# Patient Record
Sex: MALE | Race: BLACK OR AFRICAN AMERICAN | NOT HISPANIC OR LATINO | URBAN - METROPOLITAN AREA
[De-identification: names, ages, dates, MRNs, and addresses within clinical notes are randomized per-mention and may not be internally consistent; named-entity substitution may affect disease eponyms.]

---

## 2024-05-02 PROCEDURE — 99285 EMERGENCY DEPT VISIT HI MDM: CPT

## 2024-05-02 PROCEDURE — 99285 EMERGENCY DEPT VISIT HI MDM: CPT | Performed by: EMERGENCY MEDICINE

## 2024-05-02 ASSESSMENT — COLUMBIA-SUICIDE SEVERITY RATING SCALE - C-SSRS
1. IN THE PAST MONTH, HAVE YOU WISHED YOU WERE DEAD OR WISHED YOU COULD GO TO SLEEP AND NOT WAKE UP?: NO
6. HAVE YOU EVER DONE ANYTHING, STARTED TO DO ANYTHING, OR PREPARED TO DO ANYTHING TO END YOUR LIFE?: NO
2. HAVE YOU ACTUALLY HAD ANY THOUGHTS OF KILLING YOURSELF?: NO

## 2024-05-03 ENCOUNTER — HOSPITAL ENCOUNTER (OUTPATIENT)
Facility: HOSPITAL | Age: 69
Setting detail: OBSERVATION
Discharge: HOME | End: 2024-05-04
Attending: EMERGENCY MEDICINE | Admitting: STUDENT IN AN ORGANIZED HEALTH CARE EDUCATION/TRAINING PROGRAM
Payer: OTHER GOVERNMENT

## 2024-05-03 DIAGNOSIS — G47.09 OTHER INSOMNIA: ICD-10-CM

## 2024-05-03 DIAGNOSIS — F10.930 ALCOHOL WITHDRAWAL SYNDROME WITHOUT COMPLICATION (MULTI): Primary | ICD-10-CM

## 2024-05-03 LAB
ALBUMIN SERPL BCP-MCNC: 4.8 G/DL (ref 3.4–5)
ALP SERPL-CCNC: 65 U/L (ref 33–136)
ALT SERPL W P-5'-P-CCNC: 20 U/L (ref 10–52)
AMPHETAMINES UR QL SCN: ABNORMAL
ANION GAP SERPL CALC-SCNC: 14 MMOL/L (ref 10–20)
AST SERPL W P-5'-P-CCNC: 29 U/L (ref 9–39)
BARBITURATES UR QL SCN: ABNORMAL
BENZODIAZ UR QL SCN: ABNORMAL
BILIRUB SERPL-MCNC: 0.6 MG/DL (ref 0–1.2)
BUN SERPL-MCNC: 9 MG/DL (ref 6–23)
BZE UR QL SCN: ABNORMAL
CALCIUM SERPL-MCNC: 9.5 MG/DL (ref 8.6–10.6)
CANNABINOIDS UR QL SCN: ABNORMAL
CHLORIDE SERPL-SCNC: 107 MMOL/L (ref 98–107)
CO2 SERPL-SCNC: 27 MMOL/L (ref 21–32)
CREAT SERPL-MCNC: 0.79 MG/DL (ref 0.5–1.3)
EGFRCR SERPLBLD CKD-EPI 2021: >90 ML/MIN/1.73M*2
ERYTHROCYTE [DISTWIDTH] IN BLOOD BY AUTOMATED COUNT: 12.3 % (ref 11.5–14.5)
ETHANOL SERPL-MCNC: 68 MG/DL
FENTANYL+NORFENTANYL UR QL SCN: ABNORMAL
GLUCOSE SERPL-MCNC: 90 MG/DL (ref 74–99)
HCT VFR BLD AUTO: 41.3 % (ref 41–52)
HGB BLD-MCNC: 14.8 G/DL (ref 13.5–17.5)
LEVETIRACETAM SERPL-MCNC: 11 UG/ML (ref 10–40)
MAGNESIUM SERPL-MCNC: 1.75 MG/DL (ref 1.6–2.4)
MCH RBC QN AUTO: 31.6 PG (ref 26–34)
MCHC RBC AUTO-ENTMCNC: 35.8 G/DL (ref 32–36)
MCV RBC AUTO: 88 FL (ref 80–100)
METHADONE UR QL SCN: ABNORMAL
NRBC BLD-RTO: 0 /100 WBCS (ref 0–0)
OPIATES UR QL SCN: ABNORMAL
OXYCODONE+OXYMORPHONE UR QL SCN: ABNORMAL
PCP UR QL SCN: ABNORMAL
PLATELET # BLD AUTO: 221 X10*3/UL (ref 150–450)
POTASSIUM SERPL-SCNC: 3.8 MMOL/L (ref 3.5–5.3)
PROT SERPL-MCNC: 7.3 G/DL (ref 6.4–8.2)
RBC # BLD AUTO: 4.68 X10*6/UL (ref 4.5–5.9)
SODIUM SERPL-SCNC: 144 MMOL/L (ref 136–145)
WBC # BLD AUTO: 5.1 X10*3/UL (ref 4.4–11.3)

## 2024-05-03 PROCEDURE — 82077 ASSAY SPEC XCP UR&BREATH IA: CPT | Performed by: STUDENT IN AN ORGANIZED HEALTH CARE EDUCATION/TRAINING PROGRAM

## 2024-05-03 PROCEDURE — 2500000004 HC RX 250 GENERAL PHARMACY W/ HCPCS (ALT 636 FOR OP/ED): Mod: SE

## 2024-05-03 PROCEDURE — 84155 ASSAY OF PROTEIN SERUM: CPT

## 2024-05-03 PROCEDURE — 80307 DRUG TEST PRSMV CHEM ANLYZR: CPT | Performed by: STUDENT IN AN ORGANIZED HEALTH CARE EDUCATION/TRAINING PROGRAM

## 2024-05-03 PROCEDURE — 80053 COMPREHEN METABOLIC PANEL: CPT

## 2024-05-03 PROCEDURE — 83735 ASSAY OF MAGNESIUM: CPT

## 2024-05-03 PROCEDURE — 2500000001 HC RX 250 WO HCPCS SELF ADMINISTERED DRUGS (ALT 637 FOR MEDICARE OP): Performed by: STUDENT IN AN ORGANIZED HEALTH CARE EDUCATION/TRAINING PROGRAM

## 2024-05-03 PROCEDURE — 96360 HYDRATION IV INFUSION INIT: CPT | Performed by: STUDENT IN AN ORGANIZED HEALTH CARE EDUCATION/TRAINING PROGRAM

## 2024-05-03 PROCEDURE — G0378 HOSPITAL OBSERVATION PER HR: HCPCS

## 2024-05-03 PROCEDURE — 36415 COLL VENOUS BLD VENIPUNCTURE: CPT

## 2024-05-03 PROCEDURE — 96372 THER/PROPH/DIAG INJ SC/IM: CPT

## 2024-05-03 PROCEDURE — 2500000001 HC RX 250 WO HCPCS SELF ADMINISTERED DRUGS (ALT 637 FOR MEDICARE OP)

## 2024-05-03 PROCEDURE — 80177 DRUG SCRN QUAN LEVETIRACETAM: CPT

## 2024-05-03 PROCEDURE — 85027 COMPLETE CBC AUTOMATED: CPT

## 2024-05-03 PROCEDURE — 2500000001 HC RX 250 WO HCPCS SELF ADMINISTERED DRUGS (ALT 637 FOR MEDICARE OP): Mod: SE

## 2024-05-03 PROCEDURE — 99222 1ST HOSP IP/OBS MODERATE 55: CPT

## 2024-05-03 RX ORDER — MULTIVIT-MIN/IRON FUM/FOLIC AC 7.5 MG-4
1 TABLET ORAL DAILY
Status: DISCONTINUED | OUTPATIENT
Start: 2024-05-03 | End: 2024-05-04 | Stop reason: HOSPADM

## 2024-05-03 RX ORDER — LORAZEPAM 2 MG/ML
0.5 INJECTION INTRAMUSCULAR EVERY 2 HOUR PRN
Status: DISCONTINUED | OUTPATIENT
Start: 2024-05-03 | End: 2024-05-03

## 2024-05-03 RX ORDER — CLOPIDOGREL BISULFATE 75 MG/1
75 TABLET ORAL DAILY
Status: DISCONTINUED | OUTPATIENT
Start: 2024-05-03 | End: 2024-05-04 | Stop reason: HOSPADM

## 2024-05-03 RX ORDER — LANOLIN ALCOHOL/MO/W.PET/CERES
100 CREAM (GRAM) TOPICAL DAILY
Status: DISCONTINUED | OUTPATIENT
Start: 2024-05-03 | End: 2024-05-03 | Stop reason: SDUPTHER

## 2024-05-03 RX ORDER — NALTREXONE HYDROCHLORIDE 50 MG/1
50 TABLET, FILM COATED ORAL DAILY
Status: DISCONTINUED | OUTPATIENT
Start: 2024-05-03 | End: 2024-05-04 | Stop reason: HOSPADM

## 2024-05-03 RX ORDER — NALTREXONE HYDROCHLORIDE 50 MG/1
50 TABLET, FILM COATED ORAL DAILY
COMMUNITY

## 2024-05-03 RX ORDER — LANOLIN ALCOHOL/MO/W.PET/CERES
100 CREAM (GRAM) TOPICAL DAILY
Status: DISCONTINUED | OUTPATIENT
Start: 2024-05-03 | End: 2024-05-04 | Stop reason: HOSPADM

## 2024-05-03 RX ORDER — SILDENAFIL 100 MG/1
100 TABLET, FILM COATED ORAL AS NEEDED
COMMUNITY

## 2024-05-03 RX ORDER — ATORVASTATIN CALCIUM 40 MG/1
40 TABLET, FILM COATED ORAL NIGHTLY
Status: DISCONTINUED | OUTPATIENT
Start: 2024-05-03 | End: 2024-05-04 | Stop reason: HOSPADM

## 2024-05-03 RX ORDER — FOLIC ACID 1 MG/1
1 TABLET ORAL DAILY
Status: DISCONTINUED | OUTPATIENT
Start: 2024-05-03 | End: 2024-05-04 | Stop reason: HOSPADM

## 2024-05-03 RX ORDER — FOLIC ACID 1 MG/1
1 TABLET ORAL DAILY
Status: DISCONTINUED | OUTPATIENT
Start: 2024-05-03 | End: 2024-05-03

## 2024-05-03 RX ORDER — LANOLIN ALCOHOL/MO/W.PET/CERES
500 CREAM (GRAM) TOPICAL DAILY
Status: DISCONTINUED | OUTPATIENT
Start: 2024-05-03 | End: 2024-05-04 | Stop reason: HOSPADM

## 2024-05-03 RX ORDER — BISMUTH SUBSALICYLATE 262 MG
1 TABLET,CHEWABLE ORAL DAILY
Status: DISCONTINUED | OUTPATIENT
Start: 2024-05-03 | End: 2024-05-03

## 2024-05-03 RX ORDER — LISINOPRIL 20 MG/1
20 TABLET ORAL DAILY
Status: DISCONTINUED | OUTPATIENT
Start: 2024-05-03 | End: 2024-05-04 | Stop reason: HOSPADM

## 2024-05-03 RX ORDER — LORAZEPAM 2 MG/ML
1 INJECTION INTRAMUSCULAR EVERY 2 HOUR PRN
Status: DISCONTINUED | OUTPATIENT
Start: 2024-05-03 | End: 2024-05-03

## 2024-05-03 RX ORDER — CLOPIDOGREL BISULFATE 75 MG/1
75 TABLET ORAL DAILY
COMMUNITY

## 2024-05-03 RX ORDER — FOLIC ACID 1 MG/1
1 TABLET ORAL DAILY
COMMUNITY

## 2024-05-03 RX ORDER — PHENOBARBITAL 32.4 MG/1
97.2 TABLET ORAL ONCE
Status: COMPLETED | OUTPATIENT
Start: 2024-05-03 | End: 2024-05-03

## 2024-05-03 RX ORDER — HYDROXYZINE PAMOATE 25 MG/1
25 CAPSULE ORAL 3 TIMES DAILY PRN
Status: DISCONTINUED | OUTPATIENT
Start: 2024-05-03 | End: 2024-05-04 | Stop reason: HOSPADM

## 2024-05-03 RX ORDER — LEVETIRACETAM 750 MG/1
750 TABLET ORAL 2 TIMES DAILY
Status: DISCONTINUED | OUTPATIENT
Start: 2024-05-03 | End: 2024-05-04 | Stop reason: HOSPADM

## 2024-05-03 RX ORDER — TALC
3 POWDER (GRAM) TOPICAL NIGHTLY PRN
Status: DISCONTINUED | OUTPATIENT
Start: 2024-05-03 | End: 2024-05-04 | Stop reason: HOSPADM

## 2024-05-03 RX ORDER — ENOXAPARIN SODIUM 100 MG/ML
40 INJECTION SUBCUTANEOUS EVERY 24 HOURS
Status: DISCONTINUED | OUTPATIENT
Start: 2024-05-03 | End: 2024-05-04 | Stop reason: HOSPADM

## 2024-05-03 RX ORDER — LEVETIRACETAM 750 MG/1
1 TABLET ORAL 2 TIMES DAILY
COMMUNITY

## 2024-05-03 RX ORDER — FLUOXETINE 20 MG/1
20 TABLET ORAL EVERY MORNING
COMMUNITY

## 2024-05-03 RX ORDER — BISMUTH SUBSALICYLATE 262 MG
1 TABLET,CHEWABLE ORAL DAILY
COMMUNITY

## 2024-05-03 RX ORDER — HYDROXYZINE PAMOATE 25 MG/1
25 CAPSULE ORAL 3 TIMES DAILY PRN
COMMUNITY

## 2024-05-03 RX ORDER — LORAZEPAM 0.5 MG/1
0.5 TABLET ORAL EVERY 2 HOUR PRN
Status: DISCONTINUED | OUTPATIENT
Start: 2024-05-03 | End: 2024-05-04 | Stop reason: HOSPADM

## 2024-05-03 RX ORDER — LANOLIN ALCOHOL/MO/W.PET/CERES
100 CREAM (GRAM) TOPICAL DAILY
COMMUNITY

## 2024-05-03 RX ORDER — LISINOPRIL 20 MG/1
20 TABLET ORAL DAILY
COMMUNITY

## 2024-05-03 RX ORDER — LORAZEPAM 1 MG/1
1 TABLET ORAL EVERY 2 HOUR PRN
Status: DISCONTINUED | OUTPATIENT
Start: 2024-05-03 | End: 2024-05-04 | Stop reason: HOSPADM

## 2024-05-03 RX ORDER — ATORVASTATIN CALCIUM 40 MG/1
40 TABLET, FILM COATED ORAL NIGHTLY
COMMUNITY

## 2024-05-03 RX ORDER — UBIDECARENONE 75 MG
500 CAPSULE ORAL DAILY
COMMUNITY

## 2024-05-03 RX ORDER — FLUOXETINE HYDROCHLORIDE 20 MG/1
20 CAPSULE ORAL EVERY MORNING
Status: DISCONTINUED | OUTPATIENT
Start: 2024-05-04 | End: 2024-05-04 | Stop reason: HOSPADM

## 2024-05-03 RX ORDER — MULTIVIT-MIN/IRON FUM/FOLIC AC 7.5 MG-4
1 TABLET ORAL DAILY
Status: DISCONTINUED | OUTPATIENT
Start: 2024-05-03 | End: 2024-05-03 | Stop reason: SDUPTHER

## 2024-05-03 RX ORDER — LORAZEPAM 1 MG/1
2 TABLET ORAL EVERY 2 HOUR PRN
Status: DISCONTINUED | OUTPATIENT
Start: 2024-05-03 | End: 2024-05-04 | Stop reason: HOSPADM

## 2024-05-03 RX ORDER — LORAZEPAM 2 MG/ML
2 INJECTION INTRAMUSCULAR EVERY 2 HOUR PRN
Status: DISCONTINUED | OUTPATIENT
Start: 2024-05-03 | End: 2024-05-03

## 2024-05-03 RX ADMIN — THIAMINE HCL TAB 100 MG 100 MG: 100 TAB at 07:42

## 2024-05-03 RX ADMIN — Medication 1 TABLET: at 07:42

## 2024-05-03 RX ADMIN — SODIUM CHLORIDE, POTASSIUM CHLORIDE, SODIUM LACTATE AND CALCIUM CHLORIDE 1000 ML: 600; 310; 30; 20 INJECTION, SOLUTION INTRAVENOUS at 11:50

## 2024-05-03 RX ADMIN — LISINOPRIL 20 MG: 20 TABLET ORAL at 14:48

## 2024-05-03 RX ADMIN — FOLIC ACID 1 MG: 1 TABLET ORAL at 07:42

## 2024-05-03 RX ADMIN — ATORVASTATIN CALCIUM 40 MG: 40 TABLET, FILM COATED ORAL at 20:26

## 2024-05-03 RX ADMIN — LEVETIRACETAM 750 MG: 750 TABLET, FILM COATED ORAL at 14:47

## 2024-05-03 RX ADMIN — Medication 500 MCG: at 14:47

## 2024-05-03 RX ADMIN — CLOPIDOGREL BISULFATE 75 MG: 75 TABLET ORAL at 14:47

## 2024-05-03 RX ADMIN — ENOXAPARIN SODIUM 40 MG: 100 INJECTION SUBCUTANEOUS at 20:27

## 2024-05-03 RX ADMIN — NALTREXONE HYDROCHLORIDE 50 MG: 50 TABLET, FILM COATED ORAL at 18:10

## 2024-05-03 RX ADMIN — PHENOBARBITAL 97.2 MG: 32.4 TABLET ORAL at 03:32

## 2024-05-03 SDOH — SOCIAL STABILITY: SOCIAL INSECURITY: DOES ANYONE TRY TO KEEP YOU FROM HAVING/CONTACTING OTHER FRIENDS OR DOING THINGS OUTSIDE YOUR HOME?: NO

## 2024-05-03 SDOH — SOCIAL STABILITY: SOCIAL INSECURITY: HAVE YOU HAD THOUGHTS OF HARMING ANYONE ELSE?: NO

## 2024-05-03 SDOH — SOCIAL STABILITY: SOCIAL INSECURITY: DO YOU FEEL UNSAFE GOING BACK TO THE PLACE WHERE YOU ARE LIVING?: NO

## 2024-05-03 SDOH — SOCIAL STABILITY: SOCIAL INSECURITY: HAS ANYONE EVER THREATENED TO HURT YOUR FAMILY OR YOUR PETS?: NO

## 2024-05-03 SDOH — SOCIAL STABILITY: SOCIAL INSECURITY: ARE YOU OR HAVE YOU BEEN THREATENED OR ABUSED PHYSICALLY, EMOTIONALLY, OR SEXUALLY BY ANYONE?: NO

## 2024-05-03 SDOH — SOCIAL STABILITY: SOCIAL INSECURITY: ABUSE: ADULT

## 2024-05-03 SDOH — SOCIAL STABILITY: SOCIAL INSECURITY: DO YOU FEEL ANYONE HAS EXPLOITED OR TAKEN ADVANTAGE OF YOU FINANCIALLY OR OF YOUR PERSONAL PROPERTY?: NO

## 2024-05-03 SDOH — SOCIAL STABILITY: SOCIAL INSECURITY: WERE YOU ABLE TO COMPLETE ALL THE BEHAVIORAL HEALTH SCREENINGS?: YES

## 2024-05-03 SDOH — SOCIAL STABILITY: SOCIAL INSECURITY: ARE THERE ANY APPARENT SIGNS OF INJURIES/BEHAVIORS THAT COULD BE RELATED TO ABUSE/NEGLECT?: NO

## 2024-05-03 ASSESSMENT — LIFESTYLE VARIABLES
AGITATION: NORMAL ACTIVITY
TOTAL SCORE: 5
TOTAL SCORE: 1
NAUSEA AND VOMITING: NO NAUSEA AND NO VOMITING
NAUSEA AND VOMITING: NO NAUSEA AND NO VOMITING
ORIENTATION AND CLOUDING OF SENSORIUM: ORIENTED AND CAN DO SERIAL ADDITIONS
VISUAL DISTURBANCES: NOT PRESENT
NAUSEA AND VOMITING: NO NAUSEA AND NO VOMITING
ORIENTATION AND CLOUDING OF SENSORIUM: ORIENTED AND CAN DO SERIAL ADDITIONS
VISUAL DISTURBANCES: NOT PRESENT
TOTAL SCORE: 2
AGITATION: NORMAL ACTIVITY
ANXIETY: NO ANXIETY, AT EASE
NAUSEA AND VOMITING: NO NAUSEA AND NO VOMITING
PAROXYSMAL SWEATS: NO SWEAT VISIBLE
TOTAL SCORE: 3
TOTAL SCORE: 0
NAUSEA AND VOMITING: NO NAUSEA AND NO VOMITING
BLOOD PRESSURE: 145/93
AGITATION: NORMAL ACTIVITY
AUDIT-C TOTAL SCORE: 10
BLOOD PRESSURE: 155/82
VISUAL DISTURBANCES: NOT PRESENT
SKIP TO QUESTIONS 9-10: 0
ANXIETY: NO ANXIETY, AT EASE
PAROXYSMAL SWEATS: NO SWEAT VISIBLE
ANXIETY: NO ANXIETY, AT EASE
ORIENTATION AND CLOUDING OF SENSORIUM: ORIENTED AND CAN DO SERIAL ADDITIONS
PAROXYSMAL SWEATS: NO SWEAT VISIBLE
VISUAL DISTURBANCES: NOT PRESENT
HOW OFTEN DO YOU HAVE 6 OR MORE DRINKS ON ONE OCCASION: DAILY OR ALMOST DAILY
NAUSEA AND VOMITING: NO NAUSEA AND NO VOMITING
TREMOR: NOT VISIBLE, BUT CAN BE FELT FINGERTIP TO FINGERTIP
AUDIT-C TOTAL SCORE: 10
TOTAL SCORE: 1
AGITATION: NORMAL ACTIVITY
AGITATION: NORMAL ACTIVITY
TREMOR: 2
TREMOR: NO TREMOR
PAROXYSMAL SWEATS: NO SWEAT VISIBLE
AUDITORY DISTURBANCES: NOT PRESENT
HOW MANY STANDARD DRINKS CONTAINING ALCOHOL DO YOU HAVE ON A TYPICAL DAY: 5 OR 6
HEADACHE, FULLNESS IN HEAD: NOT PRESENT
AGITATION: NORMAL ACTIVITY
VISUAL DISTURBANCES: NOT PRESENT
HEADACHE, FULLNESS IN HEAD: NOT PRESENT
AUDITORY DISTURBANCES: NOT PRESENT
AUDITORY DISTURBANCES: NOT PRESENT
ORIENTATION AND CLOUDING OF SENSORIUM: ORIENTED AND CAN DO SERIAL ADDITIONS
PULSE: 88
PULSE: 76
PAROXYSMAL SWEATS: NO SWEAT VISIBLE
HOW OFTEN DO YOU HAVE A DRINK CONTAINING ALCOHOL: 4 OR MORE TIMES A WEEK
ANXIETY: NO ANXIETY, AT EASE
BLOOD PRESSURE: 121/85
TOTAL SCORE: 1
AGITATION: NORMAL ACTIVITY
AUDITORY DISTURBANCES: NOT PRESENT
HEADACHE, FULLNESS IN HEAD: NOT PRESENT
ORIENTATION AND CLOUDING OF SENSORIUM: ORIENTED AND CAN DO SERIAL ADDITIONS
ANXIETY: MILDLY ANXIOUS
TREMOR: MODERATE, WITH PATIENT'S ARMS EXTENDED
PAROXYSMAL SWEATS: NO SWEAT VISIBLE
TOTAL SCORE: 3
ANXIETY: NO ANXIETY, AT EASE
TREMOR: NOT VISIBLE, BUT CAN BE FELT FINGERTIP TO FINGERTIP
ORIENTATION AND CLOUDING OF SENSORIUM: ORIENTED AND CAN DO SERIAL ADDITIONS
TREMOR: NO TREMOR
VISUAL DISTURBANCES: NOT PRESENT
VISUAL DISTURBANCES: NOT PRESENT
HEADACHE, FULLNESS IN HEAD: NOT PRESENT
HEADACHE, FULLNESS IN HEAD: NOT PRESENT
NAUSEA AND VOMITING: NO NAUSEA AND NO VOMITING
ORIENTATION AND CLOUDING OF SENSORIUM: ORIENTED AND CAN DO SERIAL ADDITIONS
AUDITORY DISTURBANCES: NOT PRESENT
PULSE: 89
NAUSEA AND VOMITING: NO NAUSEA AND NO VOMITING
PULSE: 93
HEADACHE, FULLNESS IN HEAD: NOT PRESENT
AUDITORY DISTURBANCES: NOT PRESENT
PAROXYSMAL SWEATS: NO SWEAT VISIBLE
ANXIETY: MILDLY ANXIOUS
ORIENTATION AND CLOUDING OF SENSORIUM: ORIENTED AND CAN DO SERIAL ADDITIONS
TREMOR: 3
PULSE: 88
HEADACHE, FULLNESS IN HEAD: NOT PRESENT
AGITATION: NORMAL ACTIVITY
BLOOD PRESSURE: 145/88
TREMOR: 3
ANXIETY: NO ANXIETY, AT EASE
HEADACHE, FULLNESS IN HEAD: NOT PRESENT
VISUAL DISTURBANCES: NOT PRESENT
PAROXYSMAL SWEATS: NO SWEAT VISIBLE

## 2024-05-03 ASSESSMENT — ACTIVITIES OF DAILY LIVING (ADL)
HEARING - LEFT EAR: FUNCTIONAL
BATHING: INDEPENDENT
JUDGMENT_ADEQUATE_SAFELY_COMPLETE_DAILY_ACTIVITIES: YES
PATIENT'S MEMORY ADEQUATE TO SAFELY COMPLETE DAILY ACTIVITIES?: YES
GROOMING: INDEPENDENT
WALKS IN HOME: INDEPENDENT
DRESSING YOURSELF: INDEPENDENT
FEEDING YOURSELF: INDEPENDENT
TOILETING: INDEPENDENT
LACK_OF_TRANSPORTATION: YES
ADEQUATE_TO_COMPLETE_ADL: YES
HEARING - RIGHT EAR: FUNCTIONAL

## 2024-05-03 ASSESSMENT — COGNITIVE AND FUNCTIONAL STATUS - GENERAL
MOBILITY SCORE: 24
DAILY ACTIVITIY SCORE: 24
PATIENT BASELINE BEDBOUND: NO

## 2024-05-03 ASSESSMENT — PAIN SCALES - GENERAL
PAINLEVEL_OUTOF10: 0 - NO PAIN
PAINLEVEL_OUTOF10: 0 - NO PAIN

## 2024-05-03 ASSESSMENT — PATIENT HEALTH QUESTIONNAIRE - PHQ9
2. FEELING DOWN, DEPRESSED OR HOPELESS: NOT AT ALL
SUM OF ALL RESPONSES TO PHQ9 QUESTIONS 1 & 2: 0
1. LITTLE INTEREST OR PLEASURE IN DOING THINGS: NOT AT ALL

## 2024-05-03 ASSESSMENT — PAIN - FUNCTIONAL ASSESSMENT: PAIN_FUNCTIONAL_ASSESSMENT: 0-10

## 2024-05-03 NOTE — ED PROVIDER NOTES
CC: Acute Intoxication     HPI:  68-year-old male presents emergency department concern for alcohol withdrawal.  [facility] states he has been prescribed a medication foralcohol withdrawal.  States he was not drinking for the last few days, but drinks 324 ounce beers this morning.  A few hours later started to experience feelings of anxiety, twitching in his face and tongue, which he associates with prior episodes of alcohol withdrawal.  He did take his withdrawal medications this morning, but is requesting something additional to help with symptoms here today.  He does not recall the name of his medication.    Records Reviewed:  Recent available ED and inpatient notes reviewed in EMR.    PMHx/PSHx:  Per HPI.   - does not have a problem list on file.  - has a past surgical history that includes MR angio head wo IV contrast (1/2/2022); MR angio neck wo IV contrast (1/2/2022); and CT angio neck (1/3/2022).    Medications:  Reviewed in EMR. See EMR for complete list of medications and doses.    Allergies:  Codeine    Social History:  - Tobacco:  has no history on file for tobacco use.   - Alcohol:  has no history on file for alcohol use.   - Illicit Drugs:  has no history on file for drug use.     ROS:  Per HPI.       ???????????????????????????????????????????????????????????????  Triage Vitals:  T 36.4 °C (97.5 °F)  HR 65  /72  RR 16  O2 95 % None (Room air)    Physical Exam  Vitals and nursing note reviewed.   Constitutional:       General: He is not in acute distress.     Appearance: Normal appearance.   HENT:      Head: Normocephalic and atraumatic.   Eyes:      Conjunctiva/sclera: Conjunctivae normal.   Cardiovascular:      Rate and Rhythm: Normal rate and regular rhythm.      Heart sounds: Normal heart sounds.   Pulmonary:      Breath sounds: Normal breath sounds. No wheezing or rales.   Abdominal:      Palpations: Abdomen is soft.      Tenderness: There is no abdominal tenderness.    Musculoskeletal:      Right lower leg: No edema.      Left lower leg: No edema.   Neurological:      Mental Status: He is alert.      Comments: Mild resting tremors in the bilateral hands.  Tongue fasciculations present.  Oriented x 4 and normal gait.   Psychiatric:         Mood and Affect: Mood normal.         Behavior: Behavior normal.      Comments: Pleasant and conversational.       ???????????????????????????????????????????????????????????????  Assessment and Plan:  68-year-old male with history of alcohol use disorder presents emergency department with symptoms of alcohol withdrawal after relapsing today.  Patient does have some mild tremors in his hands bilaterally, tongue fasciculations, reports mild anxiety, consistent with mild alcohol withdrawal.  Does have a prior history of seizures, but not recently.  Patient has had similar symptoms in the past after he stops drinking, very low suspicion for other acute process such as intracranial lesion, stroke, or electrolyte abnormality, will hold off on imaging or lab work at this time.    I am unable to [facility], the patient does not recall what medication he is prescribed at home.  Given he has not taken any medication for about 12 hours, will give him a dose of phenobarbital here, and observe him in the emergency department.  If his symptoms improved, will plan to discharge with close outpatient follow-up and instructions to continue taking his medications at home, return to the emergency department if his symptoms worsen.    ED Course:  Patient given phenobarb and allowed to sleep in the emergency department for few hours.  On repeat evaluation, but reports his tremors feel better, and he feels less anxious.  No longer has tremors in his hands, and tongue fasciculations have improved.  Initially offered admission but patient wanted to be discharged.    UPDATE: Just prior to signout, patient with interval increase in tremulousness in his  hands.  I discussed with him.  I do not feel that he would be safe going home in this condition, especially since he has had seizures in the past.  Discussed with oncoming provider, patient will be admitted for further management of alcohol withdrawal.    Social Determinants Limiting Care:  None identified    Disposition:  Signed out to oncoming provider, to be admitted    --  Hossein Amato MD  Emergency Medicine, PGY-3      Diagnoses as of 05/03/24 0734   Alcohol withdrawal syndrome without complication (Multi)     Procedures ? SmartLinks last updated 5/3/2024 3:59 AM         Hossein Amato MD  Resident  05/03/24 0638       Hossein Amato MD  Resident  05/03/24 0735

## 2024-05-03 NOTE — PROGRESS NOTES
Emergency Medicine Transition of Care Note.    I received Erik Menezes in signout from previous team.  Please see the previous ED provider note for all HPI, PE and MDM up to the time of signout at 7 AM. This is in addition to the primary record.    In brief Erik Menezes is an 68 y.o. male presenting for alcohol withdrawal.  [facility] and has a medication prescribed for withdrawal.  He states he drank a few beers this morning and took his medications a few hours later after he began to feel withdrawal symptoms.  Patient did not get relief with these medications which prompted him to present to the ED.  Upon initial exam, bilateral hand tremors, tongue fasciculations, and mild anxiety were noted, consistent with withdrawal symptoms.  Patient was given a dose of phenobarbital with a plan to be discharged.  CIWA score was 5.  Upon reassessment, patient stated he did not feel comfortable being discharged.  He endorsed having the same symptoms reported above.  For this reason, basic labs were obtained as well as formal CIWA protocol.  A request for inpatient admission was placed for further management of alcohol withdrawal.  At the time of signout we were awaiting: Labs and admission.    Diagnoses as of 05/03/24 0904   Alcohol withdrawal syndrome without complication (Multi)       Labs Reviewed   COMPREHENSIVE METABOLIC PANEL - Normal       Result Value    Glucose 90      Sodium 144      Potassium 3.8      Chloride 107      Bicarbonate 27      Anion Gap 14      Urea Nitrogen 9      Creatinine 0.79      eGFR >90      Calcium 9.5      Albumin 4.8      Alkaline Phosphatase 65      Total Protein 7.3      AST 29      Bilirubin, Total 0.6      ALT 20     CBC - Normal    WBC 5.1      nRBC 0.0      RBC 4.68      Hemoglobin 14.8      Hematocrit 41.3      MCV 88      MCH 31.6      MCHC 35.8      RDW 12.3      Platelets 221     MAGNESIUM - Normal    Magnesium 1.75     ALCOHOL   DRUG SCREEN,URINE          Medical Decision Making  CBC, CMP, and mag all normal.  CIWA protocol initiated with Ativan as needed.  Patient ordered doses of a multivitamin, thiamine, and folic acid.  Following normal lab results, patient was accepted for admission to general medicine for further management of alcohol withdrawal under the care team of Dr. Woodson.  Please refer to the admitting team for further workup and care.  Patient otherwise remains stable here in the ED.        Final diagnoses:   [F10.840] Alcohol withdrawal syndrome without complication (Multi)           Procedure  Procedures    Gemma Caballero PA-C

## 2024-05-03 NOTE — PROGRESS NOTES
Pharmacy Medication History Review    Erik Menezes is a 68 y.o. male admitted for Alcohol withdrawal syndrome without complication (Multi). Pharmacy reviewed the patient's [id number] medications and allergies for accuracy.    The list below reflects the updated PTA list. Comments regarding how patient may be taking medications differently can be found in the Admit Orders Activity  Prior to Admission Medications   Prescriptions Last Dose Informant Patient Reported?   FLUoxetine (PROzac) 20 mg tablet  Other Yes   Sig: Take 1 tablet (20 mg) by mouth once daily in the morning.   atorvastatin (Lipitor) 40 mg tablet  Other Yes   Sig: Take 1 tablet (40 mg) by mouth once daily at bedtime.   clopidogrel (Plavix) 75 mg tablet  Other Yes   Sig: Take 1 tablet (75 mg) by mouth once daily.   cyanocobalamin (Vitamin B-12) 500 mcg tablet  Other Yes   Sig: Take 1 tablet (500 mcg) by mouth once daily.   folic acid (Folvite) 1 mg tablet  Other Yes   Sig: Take 1 tablet (1 mg) by mouth once daily.   hydrOXYzine pamoate (Vistaril) 25 mg capsule  Other Yes   Sig: Take 1 capsule (25 mg) by mouth 3 times a day as needed for anxiety.   lactose-reduced food (ENSURE ORAL)  Other Yes   Sig: Take 1 Can by mouth 3 times a day. Vanilla flavor   levETIRAcetam (Keppra) 750 mg tablet  Other Yes   Sig: Take 1 tablet (750 mg) by mouth 2 times a day.   levETIRAcetam XR (Keppra XR) 750 mg tablet extended release 24 hr 24 hr tablet Not Taking Other No   Sig: TAKE 2 TABLETS BY MOUTH ONCE DAILY   Patient not taking:  New Rx for Keppra  mg ( 1 tab bid) [facility]   lisinopril 20 mg tablet  Other Yes   Sig: Take 1 tablet (20 mg) by mouth once daily.   multivitamin tablet  Other Yes   Sig: Take 1 tablet by mouth once daily.   naltrexone (Depade) 50 mg tablet  Other Yes   Sig: Take 1 tablet (50 mg) by mouth once daily.   sildenafil (Viagra) 100 mg tablet  Other Yes   Sig: Take 1 tablet (100 mg) by mouth if needed for  erectile dysfunction.   thiamine 100 mg tablet  Other Yes   Sig: Take 1 tablet (100 mg) by mouth once daily.      Facility-Administered Medications: None        The list below reflects the updated allergy list. Please review each documented allergy for additional clarification and justification.  Allergies  Never Reviewed        Severity Reactions Comments    Codeine Not Specified Itching             Patient was unable to be assessed for M2B at discharge. [facility]  [facility], and [facility] [place].    Sources used to complete the med history include out patient fill history, OARRS, and Called the [facility] in [place] to get an updated medication list ([phone])      Below are additional concerns with the patient's PTA list.  [facility]oke with [facility] pharmacist, and medication list has been updated according to their fill history.  Patient has a new Rx for Keppra 750 mg, 1 tab bid!! Instead of the Keppra  2 tabs every day.    Zita Anderson PharmD  Transitions of Care Pharmacist  [facility]  Please reach out via Secure Chat for questions, or if no response call [facility] or [facility]

## 2024-05-03 NOTE — PROGRESS NOTES
Erik Menezes is a 68 y.o. male on day    Assessment/Plan   SW consulted by IP team to discuss [facility] with Pt per his request. Met with Pt bedside. Pt confirmed his address and phone number are current.. Pt [facility] [address] [facility]. He explained he wants to go to IP rehab with them but is wait listed.  Call placed to the [facility] ED SWer Shelly Giang. [phone] ext [phone]. Spoke with her and confirmed [facility] [address] [facility]. This SWer confirmed Pt has housing presently. His assigned SWer is reportedly Saleem Quintanilla that does offsite work in the housing development. [email or web address]. Email sent to Saleem.  TRAY spoke with Pt. He explained his SWer Saleem will transport him to his appointment on Monday. He confirmed Saleem's number is [phone]. SW called and left Saleem a detailed message.  Pt being admitted to the floor.   [facility] informed of Pt today but not consulted as [facility] awaited information from the [facility].       KATERIN Henao

## 2024-05-03 NOTE — H&P
History and Physical        Subjective   Erik is a 68 y.o. male who presented to ED for Alcohol Withdrawal, admitted to Hospitalist Team D on 5/3/2024 for Alcohol withdrawal syndrome without complication (Multi).    HPI:  Erik Menezes is a 68-year-old male with PMHx Polysubstance abuse, seizures and prior stroke, who presented to the emergency department on 5/2/24 with concern for alcohol withdrawal. Patient stated he was not drinking for the last few days, and routinely drinks 3 beers every day (24 oz). However, upon further discussion patient endorsed last consuming alcohol at 3 pm on 5/2. Pt was concerned for alcohol withdrawal as he began to experience feelings of anxiety, twitching in his face and tongue, which he associates with prior episodes of alcohol withdrawal. He did take his withdrawal medications this morning (can not recall medication names), but is requesting something additional to help with symptoms in the ED. He was then prescribed Phenobarbital 97.2 mg PO and observed overnight for signs of alcohol withdrawal. Per ED documentation, pt was slated for discharge, however showed signs of tremors and decision was made to admit patient due to alcohol withdrawal history.     In discussion with patient today he says he feels much better and is hoping to go home later today. States he has not eaten early on Thursday and feels that may be why he was feeling strange earlier today. Pt stated he didn't feel right and that's why he wanted to stay, but now feels like he is ready to go home. EtOH level obtained and elevated at 68, remainder of labs wnl. CIWA scoring has been 5->3->3, and patient has not required any Lorazepam.     Allergies: Codeine (itching)  SHx: Prior IV drug use in 60/70's, # 24 oz beers daily, 0.5 PPD tobacco smoker for 50+ years, Marijuana use stopped December 2023. Lives at home alone but his daughter is coming to live with him next month      Patient Active Problem List   Diagnosis     Alcohol withdrawal syndrome without complication (Multi)      No past medical history on file.  Past Surgical History:   Procedure Laterality Date    CT ANGIO NECK W AND WO IV CONTRAST  1/3/2022    CT NECK ANGIO W AND WO IV CONTRAST 1/3/2022 [facility]    MR HEAD ANGIO WO IV CONTRAST  1/2/2022    MR HEAD ANGIO WO IV CONTRAST 1/2/2022 [facility]ICAL [facility]GAC[facility]    MR NECK ANGIO WO IV CONTRAST  1/2/2022    MR NECK ANGIO WO IV CONTRAST 1/2/2022 [facility] CL[facility]     Current Outpatient Medications   Medication Instructions    atorvastatin (LIPITOR) 40 mg, oral, Nightly    clopidogrel (PLAVIX) 75 mg, oral, Daily    cyanocobalamin (VITAMIN B-12) 500 mcg, oral, Daily    FLUoxetine (PROZAC) 20 mg, oral, Every morning    folic acid (FOLVITE) 1 mg, oral, Daily    hydrOXYzine pamoate (VISTARIL) 25 mg, oral, 3 times daily PRN    lactose-reduced food (ENSURE ORAL) 1 Can, oral, 3 times daily, Vanilla flavor    levETIRAcetam (Keppra) 750 mg tablet 1 tablet, oral, 2 times daily    levETIRAcetam XR (Keppra XR) 750 mg tablet extended release 24 hr 24 hr tablet TAKE 2 TABLETS BY MOUTH ONCE DAILY    lisinopril 20 mg, oral, Daily    multivitamin tablet 1 tablet, oral, Daily    naltrexone (DEPADE) 50 mg, oral, Daily    sildenafil (VIAGRA) 100 mg, oral, As needed    thiamine (VITAMIN B-1) 100 mg, oral, Daily      Allergies   Allergen Reactions    Codeine Itching         No family history on file.    Scheduled Medications:   atorvastatin, 40 mg, oral, Nightly  clopidogrel, 75 mg, oral, Daily  cyanocobalamin, 500 mcg, oral, Daily  enoxaparin, 40 mg, subcutaneous, q24h  [START ON 5/4/2024] FLUoxetine, 20 mg, oral, q AM  folic acid, 1 mg, oral, Daily  folic acid, 1 mg, oral, Daily  levETIRAcetam, 750 mg, oral, BID  lisinopril, 20 mg, oral, Daily  multivitamin, 1 tablet, oral, Daily  naltrexone, 50 mg, oral, Daily  thiamine, 100 mg, oral, Daily         Continuous Medications:         PRN Medications:   PRN medications: hydrOXYzine  "pamoate, LORazepam **OR** LORazepam **OR** LORazepam, LORazepam **OR** LORazepam **OR** LORazepam    Review of Systems:  Review of Systems   All other systems reviewed and are negative.       Objective   Vitals:  Most Recent: BP (!) 155/94   Pulse 88   Temp 36.6 °C (97.9 °F)   Resp 16   Ht 1.956 m (6' 5\")   Wt 74.8 kg (165 lb)   SpO2 96%   BMI 19.57 kg/m²     24hr Min/Max:  Temp  Min: 36.4 °C (97.5 °F)  Max: 36.6 °C (97.9 °F)  Pulse  Min: 65  Max: 94  BP  Min: 103/72  Max: 155/94  Resp  Min: 16  Max: 16  SpO2  Min: 95 %  Max: 97 %    No intake or output data in the 24 hours ending 05/03/24 1418      Physical exam:    Physical Exam  Constitutional:       General: He is not in acute distress.     Appearance: Normal appearance. He is not ill-appearing.   Eyes:      Extraocular Movements: Extraocular movements intact.   Cardiovascular:      Rate and Rhythm: Normal rate.      Heart sounds: No murmur heard.     No gallop.   Pulmonary:      Effort: Pulmonary effort is normal. No respiratory distress.      Breath sounds: No stridor. No wheezing.   Abdominal:      General: Abdomen is flat. There is no distension.      Palpations: Abdomen is soft.      Tenderness: There is no guarding.   Musculoskeletal:         General: Normal range of motion.      Cervical back: Normal range of motion.      Comments: 5/5 strength bilaterally, no tremors noted on exam   Skin:     Findings: No bruising or erythema.   Neurological:      General: No focal deficit present.      Mental Status: He is alert and oriented to person, place, and time. Mental status is at baseline.      Cranial Nerves: No cranial nerve deficit.      Motor: No weakness.   Psychiatric:         Mood and Affect: Mood normal.         Behavior: Behavior normal.          Lab/Radiology/Diagnostic Review:  Results for orders placed or performed during the hospital encounter of 05/03/24 (from the past 24 hour(s))   Comprehensive Metabolic Panel   Result Value Ref Range    " Glucose 90 74 - 99 mg/dL    Sodium 144 136 - 145 mmol/L    Potassium 3.8 3.5 - 5.3 mmol/L    Chloride 107 98 - 107 mmol/L    Bicarbonate 27 21 - 32 mmol/L    Anion Gap 14 10 - 20 mmol/L    Urea Nitrogen 9 6 - 23 mg/dL    Creatinine 0.79 0.50 - 1.30 mg/dL    eGFR >90 >60 mL/min/1.73m*2    Calcium 9.5 8.6 - 10.6 mg/dL    Albumin 4.8 3.4 - 5.0 g/dL    Alkaline Phosphatase 65 33 - 136 U/L    Total Protein 7.3 6.4 - 8.2 g/dL    AST 29 9 - 39 U/L    Bilirubin, Total 0.6 0.0 - 1.2 mg/dL    ALT 20 10 - 52 U/L   CBC   Result Value Ref Range    WBC 5.1 4.4 - 11.3 x10*3/uL    nRBC 0.0 0.0 - 0.0 /100 WBCs    RBC 4.68 4.50 - 5.90 x10*6/uL    Hemoglobin 14.8 13.5 - 17.5 g/dL    Hematocrit 41.3 41.0 - 52.0 %    MCV 88 80 - 100 fL    MCH 31.6 26.0 - 34.0 pg    MCHC 35.8 32.0 - 36.0 g/dL    RDW 12.3 11.5 - 14.5 %    Platelets 221 150 - 450 x10*3/uL   Magnesium   Result Value Ref Range    Magnesium 1.75 1.60 - 2.40 mg/dL   Ethanol   Result Value Ref Range    Alcohol 68 (H) <=10 mg/dL   Levetiracetam   Result Value Ref Range    Keppra 11 10 - 40 ug/mL   Drug Screen, Urine   Result Value Ref Range    Amphetamine Screen, Urine Presumptive Negative Presumptive Negative    Barbiturate Screen, Urine Presumptive Positive (A) Presumptive Negative    Benzodiazepines Screen, Urine Presumptive Negative Presumptive Negative    Cannabinoid Screen, Urine Presumptive Negative Presumptive Negative    Cocaine Metabolite Screen, Urine Presumptive Negative Presumptive Negative    Fentanyl Screen, Urine Presumptive Negative Presumptive Negative    Opiate Screen, Urine Presumptive Negative Presumptive Negative    Oxycodone Screen, Urine Presumptive Negative Presumptive Negative    PCP Screen, Urine Presumptive Negative Presumptive Negative    Methadone Screen, Urine Presumptive Negative Presumptive Negative       Assessment     Erik Menezes is a 68-year-old male with PMHx Polysubstance abuse, seizures and prior stroke, who presented to the emergency  department on 5/2/24 with concern for alcohol withdrawal. EtOH level 68, last drink 5/2/24 approximately 3 pm. Pt placed on CIWA protocol, however, has not required any Lorazepam. Pt will be restarted on home medications and continue to be monitored for any signs of withdrawal.     #Alcohol Withdrawal   :: Last drink 5/2/24 3 pm  :: s/p Phenobarbital 97.2 mg PO in ED  - Placed on CIWA protocol  -- CIWA score 5->3->3->2->1, mainly for tremors  -- EtOH level obtained 68  -- Given 1 L LR Bolus  - Ordered Thiamine and Folic acid  - Continued Naltrexone 50 mg  - PRN Lorazepam ordered  -  consult for alcohol cessation    #Seizures  #Anxiety  :: S/p Phenobarbital 97.2 mg PO in ED  - Continued home Fluoxetine 20 mg  - Hydroxyzine 25 mg TID PRN   - Continued home Keppra 750 mg BID  -- Random Keppra level 11, Pt has been without medication for two days    #CVA  :: Hx of stroke in 2023  - Continue home Atorvastatin 40 mg, Plavix 75 mg, Lisinopril 20 mg    Dietary Orders (From admission, onward)       Start     Ordered    05/03/24 1359  Adult diet Regular  Diet effective now        Question:  Diet type  Answer:  Regular    05/03/24 1359                   Fluids: LR Bolus  O2: RA  DVT ppx: Lovenox  GI ppx: NA  Code Status: Full Code   NOK: Dread Ingram ([phone] )  PCP: No Assigned PCP Generic Provider, MD       Patient seen and discussed with attending physician Dr. Woodson  Plan preliminary until cosigned by attending physician.    Tomas Dove MD  Family Medicine - PGY 1

## 2024-05-03 NOTE — DISCHARGE INSTRUCTIONS
Take your medications at home as prescribed.  Call 911 or return to the emergency department immediately if you have worsening withdrawal symptoms or if you have any other acute concerns.  [facility] as needed.

## 2024-05-04 ENCOUNTER — PHARMACY VISIT (OUTPATIENT)
Dept: PHARMACY | Facility: CLINIC | Age: 69
End: 2024-05-04
Payer: COMMERCIAL

## 2024-05-04 VITALS
WEIGHT: 165 LBS | RESPIRATION RATE: 20 BRPM | DIASTOLIC BLOOD PRESSURE: 87 MMHG | TEMPERATURE: 97.9 F | OXYGEN SATURATION: 96 % | HEIGHT: 77 IN | SYSTOLIC BLOOD PRESSURE: 123 MMHG | HEART RATE: 80 BPM | BODY MASS INDEX: 19.48 KG/M2

## 2024-05-04 PROBLEM — F10.930 ALCOHOL WITHDRAWAL SYNDROME WITHOUT COMPLICATION (MULTI): Status: RESOLVED | Noted: 2024-05-03 | Resolved: 2024-05-04

## 2024-05-04 PROBLEM — G47.09 OTHER INSOMNIA: Status: ACTIVE | Noted: 2024-05-04

## 2024-05-04 PROBLEM — G47.09 OTHER INSOMNIA: Status: RESOLVED | Noted: 2024-05-04 | Resolved: 2024-05-04

## 2024-05-04 LAB
ALBUMIN SERPL BCP-MCNC: 4.2 G/DL (ref 3.4–5)
ANION GAP SERPL CALC-SCNC: 14 MMOL/L (ref 10–20)
BASOPHILS # BLD AUTO: 0.05 X10*3/UL (ref 0–0.1)
BASOPHILS NFR BLD AUTO: 1.2 %
BUN SERPL-MCNC: 10 MG/DL (ref 6–23)
CALCIUM SERPL-MCNC: 8.8 MG/DL (ref 8.6–10.6)
CHLORIDE SERPL-SCNC: 101 MMOL/L (ref 98–107)
CO2 SERPL-SCNC: 27 MMOL/L (ref 21–32)
CREAT SERPL-MCNC: 0.82 MG/DL (ref 0.5–1.3)
EGFRCR SERPLBLD CKD-EPI 2021: >90 ML/MIN/1.73M*2
EOSINOPHIL # BLD AUTO: 0.03 X10*3/UL (ref 0–0.7)
EOSINOPHIL NFR BLD AUTO: 0.7 %
ERYTHROCYTE [DISTWIDTH] IN BLOOD BY AUTOMATED COUNT: 12 % (ref 11.5–14.5)
GLUCOSE SERPL-MCNC: 111 MG/DL (ref 74–99)
HCT VFR BLD AUTO: 40.7 % (ref 41–52)
HGB BLD-MCNC: 14 G/DL (ref 13.5–17.5)
IMM GRANULOCYTES # BLD AUTO: 0.01 X10*3/UL (ref 0–0.7)
IMM GRANULOCYTES NFR BLD AUTO: 0.2 % (ref 0–0.9)
LYMPHOCYTES # BLD AUTO: 1.83 X10*3/UL (ref 1.2–4.8)
LYMPHOCYTES NFR BLD AUTO: 42.3 %
MAGNESIUM SERPL-MCNC: 1.63 MG/DL (ref 1.6–2.4)
MCH RBC QN AUTO: 31.2 PG (ref 26–34)
MCHC RBC AUTO-ENTMCNC: 34.4 G/DL (ref 32–36)
MCV RBC AUTO: 91 FL (ref 80–100)
MONOCYTES # BLD AUTO: 0.48 X10*3/UL (ref 0.1–1)
MONOCYTES NFR BLD AUTO: 11.1 %
NEUTROPHILS # BLD AUTO: 1.93 X10*3/UL (ref 1.2–7.7)
NEUTROPHILS NFR BLD AUTO: 44.5 %
NRBC BLD-RTO: 0 /100 WBCS (ref 0–0)
PHOSPHATE SERPL-MCNC: 3.1 MG/DL (ref 2.5–4.9)
PLATELET # BLD AUTO: 206 X10*3/UL (ref 150–450)
POTASSIUM SERPL-SCNC: 3.9 MMOL/L (ref 3.5–5.3)
RBC # BLD AUTO: 4.49 X10*6/UL (ref 4.5–5.9)
SODIUM SERPL-SCNC: 138 MMOL/L (ref 136–145)
WBC # BLD AUTO: 4.3 X10*3/UL (ref 4.4–11.3)

## 2024-05-04 PROCEDURE — 2500000001 HC RX 250 WO HCPCS SELF ADMINISTERED DRUGS (ALT 637 FOR MEDICARE OP)

## 2024-05-04 PROCEDURE — 80051 ELECTROLYTE PANEL: CPT

## 2024-05-04 PROCEDURE — RXMED WILLOW AMBULATORY MEDICATION CHARGE

## 2024-05-04 PROCEDURE — G0378 HOSPITAL OBSERVATION PER HR: HCPCS

## 2024-05-04 PROCEDURE — 85025 COMPLETE CBC W/AUTO DIFF WBC: CPT

## 2024-05-04 PROCEDURE — 83735 ASSAY OF MAGNESIUM: CPT

## 2024-05-04 PROCEDURE — 80069 RENAL FUNCTION PANEL: CPT

## 2024-05-04 PROCEDURE — 96360 HYDRATION IV INFUSION INIT: CPT | Performed by: STUDENT IN AN ORGANIZED HEALTH CARE EDUCATION/TRAINING PROGRAM

## 2024-05-04 PROCEDURE — 99239 HOSP IP/OBS DSCHRG MGMT >30: CPT

## 2024-05-04 PROCEDURE — 36415 COLL VENOUS BLD VENIPUNCTURE: CPT

## 2024-05-04 RX ORDER — TALC
3 POWDER (GRAM) TOPICAL NIGHTLY PRN
Qty: 30 TABLET | Refills: 2 | Status: SHIPPED | OUTPATIENT
Start: 2024-05-04 | End: 2024-08-02

## 2024-05-04 RX ADMIN — LISINOPRIL 20 MG: 20 TABLET ORAL at 08:15

## 2024-05-04 RX ADMIN — NALTREXONE HYDROCHLORIDE 50 MG: 50 TABLET, FILM COATED ORAL at 08:18

## 2024-05-04 RX ADMIN — THIAMINE HCL TAB 100 MG 100 MG: 100 TAB at 08:15

## 2024-05-04 RX ADMIN — CLOPIDOGREL BISULFATE 75 MG: 75 TABLET ORAL at 08:15

## 2024-05-04 RX ADMIN — Medication 500 MCG: at 08:15

## 2024-05-04 RX ADMIN — LEVETIRACETAM 750 MG: 750 TABLET, FILM COATED ORAL at 00:02

## 2024-05-04 RX ADMIN — Medication 3 MG: at 00:02

## 2024-05-04 RX ADMIN — FLUOXETINE 20 MG: 20 CAPSULE ORAL at 08:15

## 2024-05-04 RX ADMIN — FOLIC ACID 1 MG: 1 TABLET ORAL at 08:15

## 2024-05-04 RX ADMIN — Medication 1 TABLET: at 08:15

## 2024-05-04 ASSESSMENT — COGNITIVE AND FUNCTIONAL STATUS - GENERAL
MOBILITY SCORE: 24
DAILY ACTIVITIY SCORE: 24

## 2024-05-04 ASSESSMENT — LIFESTYLE VARIABLES
NAUSEA AND VOMITING: NO NAUSEA AND NO VOMITING
AGITATION: NORMAL ACTIVITY
TOTAL SCORE: 0
AUDITORY DISTURBANCES: NOT PRESENT
TREMOR: NO TREMOR
PULSE: 84
ORIENTATION AND CLOUDING OF SENSORIUM: ORIENTED AND CAN DO SERIAL ADDITIONS
VISUAL DISTURBANCES: NOT PRESENT
TREMOR: NO TREMOR
PAROXYSMAL SWEATS: NO SWEAT VISIBLE
NAUSEA AND VOMITING: NO NAUSEA AND NO VOMITING
ANXIETY: NO ANXIETY, AT EASE
AUDITORY DISTURBANCES: NOT PRESENT
BLOOD PRESSURE: 125/82
NAUSEA AND VOMITING: NO NAUSEA AND NO VOMITING
HEADACHE, FULLNESS IN HEAD: NOT PRESENT
AGITATION: NORMAL ACTIVITY
HEADACHE, FULLNESS IN HEAD: NOT PRESENT
VISUAL DISTURBANCES: NOT PRESENT
VISUAL DISTURBANCES: NOT PRESENT
ANXIETY: NO ANXIETY, AT EASE
TOTAL SCORE: 0
HEADACHE, FULLNESS IN HEAD: NOT PRESENT
TOTAL SCORE: 0
PAROXYSMAL SWEATS: NO SWEAT VISIBLE
PAROXYSMAL SWEATS: NO SWEAT VISIBLE
TREMOR: NO TREMOR
ANXIETY: NO ANXIETY, AT EASE
AGITATION: NORMAL ACTIVITY
ORIENTATION AND CLOUDING OF SENSORIUM: ORIENTED AND CAN DO SERIAL ADDITIONS
ORIENTATION AND CLOUDING OF SENSORIUM: ORIENTED AND CAN DO SERIAL ADDITIONS
AUDITORY DISTURBANCES: NOT PRESENT

## 2024-05-04 ASSESSMENT — PAIN SCALES - GENERAL
PAINLEVEL_OUTOF10: 0 - NO PAIN
PAINLEVEL_OUTOF10: 0 - NO PAIN

## 2024-05-04 ASSESSMENT — PAIN - FUNCTIONAL ASSESSMENT: PAIN_FUNCTIONAL_ASSESSMENT: 0-10

## 2024-05-04 NOTE — DISCHARGE SUMMARY
Discharge Diagnosis  Alcohol withdrawal syndrome without complication (Multi)    Issues Requiring Follow-Up  PCP - alcohol cessation    Test Results Pending At Discharge  Pending Labs       No current pending labs.            Hospital Course  Erik Menezes is a 68-year-old male with PMHx Polysubstance abuse, seizures and prior stroke, who presented to the emergency department on 5/2/24 with concern for alcohol withdrawal. Patient stated he was not drinking for the last few days, and routinely drinks 3 beers every day (24 oz). However, upon further discussion patient endorsed last consuming alcohol at 3 pm on 5/2. Pt was concerned for alcohol withdrawal as he began to experience feelings of anxiety, twitching in his face and tongue, which he associates with prior episodes of alcohol withdrawal. He did take his withdrawal medications this morning (can not recall medication names), but is requesting something additional to help with symptoms in the ED. He was then prescribed Phenobarbital 97.2 mg PO and observed overnight for signs of alcohol withdrawal. Per ED documentation, pt was slated for discharge, however showed signs of tremors and decision was made to admit patient due to alcohol withdrawal history.      EtOH level  elevated at 68, remainder of labs wnl. CIWA scoring has been 5->3->3, and patient has not required any Lorazepam. Pt monitored overnight for signs of withdrawal and CIWA continued to decrease to 0. Pt required no lorazepam during this admission. Pt remains afebrile, hemodynamically stable and exhibiting no signs concerning for alcohol withdrawal. He will be discharged home at this time with a prescription for melatonin and also provided educational resources for alcohol cessation.    Pertinent Physical Exam At Time of Discharge  Physical Exam  Constitutional:       General: He is not in acute distress.     Appearance: Normal appearance. He is not ill-appearing.   HENT:      Nose: No congestion or  rhinorrhea.   Eyes:      Extraocular Movements: Extraocular movements intact.      Pupils: Pupils are equal, round, and reactive to light.   Cardiovascular:      Rate and Rhythm: Normal rate and regular rhythm.      Pulses: Normal pulses.      Heart sounds: Normal heart sounds. No murmur heard.     No gallop.   Pulmonary:      Effort: Pulmonary effort is normal. No respiratory distress.      Breath sounds: Normal breath sounds. No wheezing.   Abdominal:      General: Abdomen is flat. Bowel sounds are normal. There is no distension.      Palpations: Abdomen is soft.      Tenderness: There is no abdominal tenderness. There is no guarding.   Skin:     General: Skin is warm and dry.      Capillary Refill: Capillary refill takes less than 2 seconds.   Neurological:      General: No focal deficit present.      Mental Status: He is alert and oriented to person, place, and time. Mental status is at baseline.   Psychiatric:         Mood and Affect: Mood normal.         Behavior: Behavior normal.         Home Medications     Medication List      START taking these medications     melatonin 3 mg tablet; Take 1 tablet (3 mg) by mouth as needed at   bedtime for sleep.     CONTINUE taking these medications     atorvastatin 40 mg tablet; Commonly known as: Lipitor   clopidogrel 75 mg tablet; Commonly known as: Plavix   cyanocobalamin 500 mcg tablet; Commonly known as: Vitamin B-12   ENSURE ORAL   FLUoxetine 20 mg tablet; Commonly known as: PROzac   folic acid 1 mg tablet; Commonly known as: Folvite   hydrOXYzine pamoate 25 mg capsule; Commonly known as: Vistaril   * levETIRAcetam 750 mg tablet; Commonly known as: Keppra   lisinopril 20 mg tablet   multivitamin tablet   naltrexone 50 mg tablet; Commonly known as: Depade   sildenafil 100 mg tablet; Commonly known as: Viagra   thiamine 100 mg tablet; Commonly known as: Vitamin B-1  * This list has 1 medication(s) that are the same as other medications   prescribed for you. Read the  directions carefully, and ask your doctor or   other care provider to review them with you.     ASK your doctor about these medications     * levETIRAcetam  mg tablet extended release 24 hr 24 hr tablet;   Commonly known as: Keppra XR; TAKE 2 TABLETS BY MOUTH ONCE DAILY  * This list has 1 medication(s) that are the same as other medications   prescribed for you. Read the directions carefully, and ask your doctor or   other care provider to review them with you.       Outpatient Follow-Up  No future appointments.    Tomas Dove MD

## 2024-05-04 NOTE — CARE PLAN
The patient's goals for the shift include    Problem: Pain  Goal: My pain/discomfort is manageable  Outcome: Progressing     Problem: Safety  Goal: Patient will be injury free during hospitalization  Outcome: Progressing  Goal: I will remain free of falls  Outcome: Progressing     Problem: Daily Care  Goal: Daily care needs are met  Outcome: Progressing     Problem: Psychosocial Needs  Goal: Demonstrates ability to cope with hospitalization/illness  Outcome: Progressing  Goal: Collaborate with me, my family, and caregiver to identify my specific goals  Outcome: Progressing     Problem: Discharge Barriers  Goal: My discharge needs are met  Outcome: Progressing       The clinical goals for the shift include Pt will remain safe and free from injury and falls throughout this shift.

## 2024-05-04 NOTE — HOSPITAL COURSE
Erik Menezes is a 68-year-old male with PMHx Polysubstance abuse, seizures and prior stroke, who presented to the emergency department on 5/2/24 with concern for alcohol withdrawal. Patient stated he was not drinking for the last few days, and routinely drinks 3 beers every day (24 oz). However, upon further discussion patient endorsed last consuming alcohol at 3 pm on 5/2. Pt was concerned for alcohol withdrawal as he began to experience feelings of anxiety, twitching in his face and tongue, which he associates with prior episodes of alcohol withdrawal. He did take his withdrawal medications this morning (can not recall medication names), but is requesting something additional to help with symptoms in the ED. He was then prescribed Phenobarbital 97.2 mg PO and observed overnight for signs of alcohol withdrawal. Per ED documentation, pt was slated for discharge, however showed signs of tremors and decision was made to admit patient due to alcohol withdrawal history.      EtOH level  elevated at 68, remainder of labs wnl. CIWA scoring has been 5->3->3, and patient has not required any Lorazepam. Pt monitored overnight for signs of withdrawal and CIWA continued to decrease to 0. Pt required no lorazepam during this admission. Pt remains afebrile, hemodynamically stable and exhibiting no signs concerning for alcohol withdrawal. He will be discharged home at this time with a prescription for melatonin and also provided educational resources for alcohol cessation.